# Patient Record
Sex: MALE | Race: WHITE | NOT HISPANIC OR LATINO
[De-identification: names, ages, dates, MRNs, and addresses within clinical notes are randomized per-mention and may not be internally consistent; named-entity substitution may affect disease eponyms.]

---
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
## 2020-08-25 ENCOUNTER — APPOINTMENT (OUTPATIENT)
Dept: ALLERGY | Facility: CLINIC | Age: 20
End: 2020-08-25
Payer: COMMERCIAL

## 2020-08-25 VITALS
TEMPERATURE: 98.3 F | HEART RATE: 64 BPM | WEIGHT: 180 LBS | RESPIRATION RATE: 16 BRPM | SYSTOLIC BLOOD PRESSURE: 100 MMHG | BODY MASS INDEX: 23.86 KG/M2 | HEIGHT: 73 IN | DIASTOLIC BLOOD PRESSURE: 70 MMHG | OXYGEN SATURATION: 98 %

## 2020-08-25 PROCEDURE — 99214 OFFICE O/P EST MOD 30 MIN: CPT

## 2020-08-25 NOTE — ASSESSMENT
[FreeTextEntry1] : Cough secondary to PND and asthma:\par \par Singulair 10 mg QD\par Proair 2 puffs QID prn\par RV any recurrent symptoms

## 2020-08-25 NOTE — SOCIAL HISTORY
[Mother] : mother [Father] : father [Sister] : sister [Single] : single [None] : none [FreeTextEntry2] : will be dorming at [employer]  [Smokers in Household] : there are no smokers in the home

## 2020-08-25 NOTE — HISTORY OF PRESENT ILLNESS
[Eczematous rashes] : eczematous rashes [Food Allergies] : food allergies [Venom Reactions] : venom reactions [de-identified] : Increased coughing in order to enable him to breath - he has no more of his rescue inhaler.    He has had coughing episodes sporadically.  His nose feels open and clear.

## 2020-08-25 NOTE — PHYSICAL EXAM
[Healthy Appearance] : healthy appearance [Well Nourished] : well nourished [Alert] : alert [No Acute Distress] : no acute distress [Well Developed] : well developed [Normal Voice/Communication] : normal voice communication [Normal Lips/Tongue] : the lips and tongue were normal [Normal Nasal Mucosa] : the nasal mucosa was normal [Normal Tonsils] : normal tonsils [No Oral Lesions or Ulcers] : no oral lesions or ulcers [No LAD] : no lymphadenopathy [No Neck Mass] : no neck mass was observed [No Thyroid Mass] : no thyroid mass [Supple] : the neck was supple [Normal Rate and Effort] : normal respiratory rhythm and effort [No Crackles] : no crackles [No Retractions] : no retractions [Bilateral Audible Breath Sounds] : bilateral audible breath sounds [Normal Rate] : heart rate was normal  [Normal S1, S2] : normal S1 and S2 [No murmur] : no murmur [Regular Rhythm] : with a regular rhythm [Normal Cervical Lymph Nodes] : cervical [No clubbing] : no clubbing [No Edema] : no edema [No Cyanosis] : no cyanosis [Normal Mood] : mood was normal [Judgment and Insight Age Appropriate] : judgement and insight is age appropriate [Normal Affect] : affect was normal [Alert, Awake, Oriented as Age-Appropriate] : alert, awake, oriented as age appropriate [Suborbital Bogginess] : no suborbital bogginess (allergic shiners) [Conjunctival Erythema] : no conjunctival erythema [Boggy Nasal Turbinates] : no boggy and/or pale nasal turbinates [Wheezing] : no wheezing was heard [Eczematous Patches] : no eczematous patches [Xerosis] : no xerosis

## 2023-07-11 ENCOUNTER — NON-APPOINTMENT (OUTPATIENT)
Age: 23
End: 2023-07-11

## 2023-07-11 ENCOUNTER — APPOINTMENT (OUTPATIENT)
Dept: ALLERGY | Facility: CLINIC | Age: 23
End: 2023-07-11
Payer: COMMERCIAL

## 2023-07-11 VITALS
RESPIRATION RATE: 14 BRPM | SYSTOLIC BLOOD PRESSURE: 108 MMHG | TEMPERATURE: 98.9 F | DIASTOLIC BLOOD PRESSURE: 70 MMHG | HEART RATE: 62 BPM | OXYGEN SATURATION: 97 %

## 2023-07-11 PROCEDURE — 99213 OFFICE O/P EST LOW 20 MIN: CPT

## 2023-07-11 RX ORDER — ALBUTEROL SULFATE 90 UG/1
108 (90 BASE) INHALANT RESPIRATORY (INHALATION)
Qty: 1 | Refills: 0 | Status: DISCONTINUED | COMMUNITY
Start: 2019-06-10 | End: 2023-07-11

## 2023-07-11 RX ORDER — ALBUTEROL SULFATE 90 UG/1
108 (90 BASE) POWDER, METERED RESPIRATORY (INHALATION)
Qty: 1 | Refills: 1 | Status: DISCONTINUED | COMMUNITY
Start: 2019-06-10 | End: 2023-07-11

## 2023-07-11 RX ORDER — MONTELUKAST 10 MG/1
10 TABLET, FILM COATED ORAL DAILY
Qty: 90 | Refills: 4 | Status: DISCONTINUED | COMMUNITY
Start: 2020-08-25 | End: 2023-07-11

## 2023-07-11 RX ORDER — FLUTICASONE PROPIONATE 50 UG/1
50 SPRAY, METERED NASAL DAILY
Qty: 1 | Refills: 2 | Status: DISCONTINUED | COMMUNITY
Start: 2018-08-21 | End: 2023-07-11

## 2023-07-11 RX ORDER — ALBUTEROL SULFATE 90 UG/1
108 (90 BASE) INHALANT RESPIRATORY (INHALATION)
Qty: 1 | Refills: 2 | Status: ACTIVE | COMMUNITY
Start: 2020-08-25 | End: 1900-01-01

## 2023-07-11 RX ORDER — AZELASTINE HYDROCHLORIDE 137 UG/1
0.1 SPRAY, METERED NASAL TWICE DAILY
Qty: 1 | Refills: 2 | Status: DISCONTINUED | COMMUNITY
Start: 2018-08-21 | End: 2023-07-11

## 2023-07-11 RX ORDER — ALBUTEROL SULFATE 90 UG/1
108 (90 BASE) AEROSOL, METERED RESPIRATORY (INHALATION)
Qty: 1 | Refills: 5 | Status: DISCONTINUED | COMMUNITY
Start: 2018-08-21 | End: 2023-07-11

## 2023-07-11 NOTE — SOCIAL HISTORY
[None] : none [Single] : single [Smokers in Household] : there are no smokers in the home [Mother] : mother [Father] : father [Sister] : sister [FreeTextEntry2] : will be dorming at [place]

## 2023-07-11 NOTE — ASSESSMENT
[FreeTextEntry1] : Mild intermittent asthma:\par \par Albuterol 2 puffs QID prn \par If coughing becomes more problematic will restart on Singulair \par Advised patient to carry the albuterol inhaler with him in case he develops acute coughing/wheezing\par RV 1 year or prn

## 2023-07-11 NOTE — HISTORY OF PRESENT ILLNESS
[de-identified] : Patient has not used his rescue inhaler since last seen - ran out of medication - never started Singulair - has some AM coughing

## 2023-07-11 NOTE — PHYSICAL EXAM
[Alert] : alert [Well Nourished] : well nourished [No Acute Distress] : no acute distress [Well Developed] : well developed [No Neck Mass] : no neck mass was observed [No LAD] : no lymphadenopathy [Normal Rate and Effort] : normal respiratory rhythm and effort [No Crackles] : no crackles [No Retractions] : no retractions [Bilateral Audible Breath Sounds] : bilateral audible breath sounds [Wheezing] : no wheezing was heard [Normal Rate] : heart rate was normal  [Normal S1, S2] : normal S1 and S2 [Regular Rhythm] : with a regular rhythm [Normal Cervical Lymph Nodes] : cervical [No clubbing] : no clubbing [No Cyanosis] : no cyanosis [Normal Mood] : mood was normal [Normal Affect] : affect was normal [Judgment and Insight Age Appropriate] : judgement and insight is age appropriate [Alert, Awake, Oriented as Age-Appropriate] : alert, awake, oriented as age appropriate

## 2023-10-24 ENCOUNTER — APPOINTMENT (OUTPATIENT)
Dept: PULMONOLOGY | Facility: CLINIC | Age: 23
End: 2023-10-24
Payer: COMMERCIAL

## 2023-10-24 VITALS
TEMPERATURE: 98.7 F | WEIGHT: 215 LBS | OXYGEN SATURATION: 98 % | DIASTOLIC BLOOD PRESSURE: 70 MMHG | BODY MASS INDEX: 28.49 KG/M2 | HEIGHT: 73 IN | HEART RATE: 74 BPM | SYSTOLIC BLOOD PRESSURE: 110 MMHG | RESPIRATION RATE: 18 BRPM

## 2023-10-24 DIAGNOSIS — R06.02 SHORTNESS OF BREATH: ICD-10-CM

## 2023-10-24 DIAGNOSIS — Z78.9 OTHER SPECIFIED HEALTH STATUS: ICD-10-CM

## 2023-10-24 DIAGNOSIS — K21.9 GASTRO-ESOPHAGEAL REFLUX DISEASE W/OUT ESOPHAGITIS: ICD-10-CM

## 2023-10-24 DIAGNOSIS — J45.991 COUGH VARIANT ASTHMA: ICD-10-CM

## 2023-10-24 DIAGNOSIS — Z15.89 GENETIC SUSCEPTIBILITY TO OTHER DISEASE: ICD-10-CM

## 2023-10-24 DIAGNOSIS — Q67.6 PECTUS EXCAVATUM: ICD-10-CM

## 2023-10-24 DIAGNOSIS — J30.89 OTHER ALLERGIC RHINITIS: ICD-10-CM

## 2023-10-24 DIAGNOSIS — Z83.79 FAMILY HISTORY OF OTHER DISEASES OF THE DIGESTIVE SYSTEM: ICD-10-CM

## 2023-10-24 DIAGNOSIS — Z80.8 FAMILY HISTORY OF MALIGNANT NEOPLASM OF OTHER ORGANS OR SYSTEMS: ICD-10-CM

## 2023-10-24 DIAGNOSIS — Z87.898 PERSONAL HISTORY OF OTHER SPECIFIED CONDITIONS: ICD-10-CM

## 2023-10-24 DIAGNOSIS — R55 SYNCOPE AND COLLAPSE: ICD-10-CM

## 2023-10-24 DIAGNOSIS — R05.3 CHRONIC COUGH: ICD-10-CM

## 2023-10-24 PROCEDURE — 99204 OFFICE O/P NEW MOD 45 MIN: CPT | Mod: 25

## 2023-10-24 PROCEDURE — 71046 X-RAY EXAM CHEST 2 VIEWS: CPT

## 2023-10-24 PROCEDURE — 95012 NITRIC OXIDE EXP GAS DETER: CPT

## 2023-10-24 PROCEDURE — 94010 BREATHING CAPACITY TEST: CPT

## 2023-10-24 PROCEDURE — 94729 DIFFUSING CAPACITY: CPT

## 2023-10-24 PROCEDURE — 94727 GAS DIL/WSHOT DETER LNG VOL: CPT

## 2023-10-24 PROCEDURE — 94618 PULMONARY STRESS TESTING: CPT

## 2023-10-24 RX ORDER — FLUTICASONE FUROATE AND VILANTEROL TRIFENATATE 200; 25 UG/1; UG/1
200-25 POWDER RESPIRATORY (INHALATION)
Qty: 1 | Refills: 2 | Status: ACTIVE | COMMUNITY
Start: 2023-10-24 | End: 1900-01-01

## 2023-10-24 RX ORDER — FLUTICASONE PROPIONATE 50 UG/1
50 SPRAY, METERED NASAL
Refills: 0 | Status: ACTIVE | COMMUNITY

## 2023-10-24 RX ORDER — FEXOFENADINE HCL 180 MG
180 TABLET ORAL
Refills: 0 | Status: ACTIVE | COMMUNITY

## 2023-12-05 ENCOUNTER — LABORATORY RESULT (OUTPATIENT)
Age: 23
End: 2023-12-05

## 2023-12-06 LAB
24R-OH-CALCIDIOL SERPL-MCNC: 60.1 PG/ML
25(OH)D3 SERPL-MCNC: 47.3 NG/ML
A1AT SERPL-MCNC: 128 MG/DL
BASOPHILS # BLD AUTO: 0.04 K/UL
BASOPHILS NFR BLD AUTO: 0.5 %
EOSINOPHIL # BLD AUTO: 0.32 K/UL
EOSINOPHIL NFR BLD AUTO: 4.1 %
HCT VFR BLD CALC: 45.9 %
HGB BLD-MCNC: 15.3 G/DL
IMM GRANULOCYTES NFR BLD AUTO: 0.3 %
LYMPHOCYTES # BLD AUTO: 2.19 K/UL
LYMPHOCYTES NFR BLD AUTO: 27.8 %
MAN DIFF?: NORMAL
MCHC RBC-ENTMCNC: 30.4 PG
MCHC RBC-ENTMCNC: 33.3 GM/DL
MCV RBC AUTO: 91.3 FL
MONOCYTES # BLD AUTO: 0.62 K/UL
MONOCYTES NFR BLD AUTO: 7.9 %
NEUTROPHILS # BLD AUTO: 4.68 K/UL
NEUTROPHILS NFR BLD AUTO: 59.4 %
PLATELET # BLD AUTO: 282 K/UL
RBC # BLD: 5.03 M/UL
RBC # FLD: 11.9 %
WBC # FLD AUTO: 7.87 K/UL

## 2023-12-07 LAB
A ALTERNATA IGE QN: 0.62 KUA/L
A ALTERNATA IGE QN: 0.62 KUA/L
A FUMIGATUS IGE QN: <0.1 KUA/L
A FUMIGATUS IGE QN: <0.1 KUA/L
ALMOND IGE QN: 0.82 KUA/L
BERMUDA GRASS IGE QN: <0.1 KUA/L
BOXELDER IGE QN: 0.15 KUA/L
BRAZIL NUT IGE QN: <0.1 KUA/L
C ALBICANS IGE QN: 0.28 KUA/L
C HERBARUM IGE QN: <0.1 KUA/L
C HERBARUM IGE QN: <0.1 KUA/L
CALIF WALNUT IGE QN: 0.7 KUA/L
CASHEW NUT IGE QN: <0.1 KUA/L
CAT DANDER IGE QN: 2.44 KUA/L
CAT DANDER IGE QN: 2.44 KUA/L
CMN PIGWEED IGE QN: <0.1 KUA/L
CODFISH IGE QN: <0.1 KUA/L
COMMON RAGWEED IGE QN: 0.17 KUA/L
COMMON RAGWEED IGE QN: 0.17 KUA/L
COTTONWOOD IGE QN: <0.1 KUA/L
COW MILK IGE QN: <0.1 KUA/L
D FARINAE IGE QN: 1.95 KUA/L
D FARINAE IGE QN: 1.95 KUA/L
D PTERONYSS IGE QN: 1.13 KUA/L
D PTERONYSS IGE QN: 1.13 KUA/L
DEPRECATED A ALTERNATA IGE RAST QL: 1 (ref 0–?)
DEPRECATED A ALTERNATA IGE RAST QL: 1 (ref 0–?)
DEPRECATED A FUMIGATUS IGE RAST QL: 0 (ref 0–?)
DEPRECATED A FUMIGATUS IGE RAST QL: 0 (ref 0–?)
DEPRECATED ALMOND IGE RAST QL: 2 (ref 0–?)
DEPRECATED BERMUDA GRASS IGE RAST QL: 0 (ref 0–?)
DEPRECATED BOXELDER IGE RAST QL: NORMAL (ref 0–?)
DEPRECATED BRAZIL NUT IGE RAST QL: 0 (ref 0–?)
DEPRECATED C ALBICANS IGE RAST QL: NORMAL
DEPRECATED C HERBARUM IGE RAST QL: 0 (ref 0–?)
DEPRECATED C HERBARUM IGE RAST QL: 0 (ref 0–?)
DEPRECATED CASHEW NUT IGE RAST QL: 0 (ref 0–?)
DEPRECATED CAT DANDER IGE RAST QL: 2 (ref 0–?)
DEPRECATED CAT DANDER IGE RAST QL: 2 (ref 0–?)
DEPRECATED CODFISH IGE RAST QL: 0 (ref 0–?)
DEPRECATED COMMON PIGWEED IGE RAST QL: 0 (ref 0–?)
DEPRECATED COMMON RAGWEED IGE RAST QL: NORMAL (ref 0–?)
DEPRECATED COMMON RAGWEED IGE RAST QL: NORMAL (ref 0–?)
DEPRECATED COTTONWOOD IGE RAST QL: 0 (ref 0–?)
DEPRECATED COW MILK IGE RAST QL: 0 (ref 0–?)
DEPRECATED D FARINAE IGE RAST QL: 2 (ref 0–?)
DEPRECATED D FARINAE IGE RAST QL: 2 (ref 0–?)
DEPRECATED D PTERONYSS IGE RAST QL: 2 (ref 0–?)
DEPRECATED D PTERONYSS IGE RAST QL: 2 (ref 0–?)
DEPRECATED DOG DANDER IGE RAST QL: 1 (ref 0–?)
DEPRECATED DOG DANDER IGE RAST QL: 1 (ref 0–?)
DEPRECATED DUCK FEATHER IGE RAST QL: 0
DEPRECATED EGG WHITE IGE RAST QL: 0 (ref 0–?)
DEPRECATED GOOSE FEATHER IGE RAST QL: 0
DEPRECATED GOOSEFOOT IGE RAST QL: NORMAL (ref 0–?)
DEPRECATED HAZELNUT IGE RAST QL: 3 (ref 0–?)
DEPRECATED LONDON PLANE IGE RAST QL: 0 (ref 0–?)
DEPRECATED M RACEMOSUS IGE RAST QL: 0
DEPRECATED MOUSE URINE PROT IGE RAST QL: NORMAL (ref 0–?)
DEPRECATED MUGWORT IGE RAST QL: 0 (ref 0–?)
DEPRECATED P NOTATUM IGE RAST QL: 0 (ref 0–?)
DEPRECATED PEANUT IGE RAST QL: 1 (ref 0–?)
DEPRECATED RED CEDAR IGE RAST QL: 0 (ref 0–?)
DEPRECATED ROACH IGE RAST QL: 0 (ref 0–?)
DEPRECATED ROACH IGE RAST QL: 0 (ref 0–?)
DEPRECATED SALMON IGE RAST QL: 0 (ref 0–?)
DEPRECATED SCALLOP IGE RAST QL: <0.1 KUA/L
DEPRECATED SESAME SEED IGE RAST QL: 0 (ref 0–?)
DEPRECATED SHEEP SORREL IGE RAST QL: 0 (ref 0–?)
DEPRECATED SHRIMP IGE RAST QL: 0 (ref 0–?)
DEPRECATED SILVER BIRCH IGE RAST QL: 3 (ref 0–?)
DEPRECATED SOYBEAN IGE RAST QL: 0 (ref 0–?)
DEPRECATED TIMOTHY IGE RAST QL: NORMAL (ref 0–?)
DEPRECATED TIMOTHY IGE RAST QL: NORMAL (ref 0–?)
DEPRECATED TUNA IGE RAST QL: 0 (ref 0–?)
DEPRECATED WALNUT IGE RAST QL: 0 (ref 0–?)
DEPRECATED WHEAT IGE RAST QL: 0 (ref 0–?)
DEPRECATED WHITE ASH IGE RAST QL: NORMAL (ref 0–?)
DEPRECATED WHITE OAK IGE RAST QL: 3 (ref 0–?)
DEPRECATED WHITE OAK IGE RAST QL: 3 (ref 0–?)
DOG DANDER IGE QN: 0.47 KUA/L
DOG DANDER IGE QN: 0.47 KUA/L
DUCK FEATHER IGE QN: <0.1 KUA/L
EGG WHITE IGE QN: <0.1 KUA/L
GOOSE FEATHER IGE QN: <0.1 KUA/L
GOOSEFOOT IGE QN: 0.19 KUA/L
HAZELNUT IGE QN: 6.98 KUA/L
LONDON PLANE IGE QN: <0.1 KUA/L
M RACEMOSUS IGE QN: <0.1 KUA/L
MOUSE URINE PROT IGE QN: 0.16 KUA/L
MUGWORT IGE QN: <0.1 KUA/L
MULBERRY (T70) CLASS: 0 (ref 0–?)
MULBERRY (T70) CONC: <0.1 KUA/L
P NOTATUM IGE QN: <0.1 KUA/L
PEANUT IGE QN: 0.4 KUA/L
RED CEDAR IGE QN: <0.1 KUA/L
ROACH IGE QN: <0.1 KUA/L
ROACH IGE QN: <0.1 KUA/L
SALMON IGE QN: <0.1 KUA/L
SCALLOP IGE QN: 0 (ref 0–?)
SCALLOP IGE QN: <0.1 KUA/L
SESAME SEED IGE QN: <0.1 KUA/L
SHEEP SORREL IGE QN: <0.1 KUA/L
SILVER BIRCH IGE QN: 9.29 KUA/L
SOYBEAN IGE QN: <0.1 KUA/L
TIMOTHY IGE QN: 0.32 KUA/L
TIMOTHY IGE QN: 0.32 KUA/L
TOTAL IGE SMQN RAST: 58 KU/L
TREE ALLERG MIX1 IGE QL: 2 (ref 0–?)
TUNA IGE QN: <0.1 KUA/L
WALNUT IGE QN: <0.1 KUA/L
WHEAT IGE QN: <0.1 KUA/L
WHITE ASH IGE QN: 0.11 KUA/L
WHITE ELM IGE QN: 0.6 KUA/L
WHITE ELM IGE QN: 1 (ref 0–?)
WHITE OAK IGE QN: 7.49 KUA/L
WHITE OAK IGE QN: 7.49 KUA/L

## 2023-12-09 LAB — STRONGYLOIDES AB SER IA-ACNC: NEGATIVE

## 2023-12-11 LAB
A1AT PHENOTYP SERPL-IMP: NORMAL
A1AT SERPL-MCNC: 128 MG/DL

## 2023-12-12 LAB
ANNOTATION COMMENT IMP: NORMAL
ELECTRONIC SIGNATURE: NORMAL
SERPINA1 GENE MUT TESTED BLD/T: NORMAL

## 2023-12-21 ENCOUNTER — APPOINTMENT (OUTPATIENT)
Dept: PULMONOLOGY | Facility: CLINIC | Age: 23
End: 2023-12-21

## 2024-01-08 ENCOUNTER — APPOINTMENT (OUTPATIENT)
Dept: ALLERGY | Facility: CLINIC | Age: 24
End: 2024-01-08
Payer: COMMERCIAL

## 2024-01-08 ENCOUNTER — NON-APPOINTMENT (OUTPATIENT)
Age: 24
End: 2024-01-08

## 2024-01-08 VITALS
HEIGHT: 73 IN | BODY MASS INDEX: 28.49 KG/M2 | HEART RATE: 72 BPM | OXYGEN SATURATION: 96 % | DIASTOLIC BLOOD PRESSURE: 66 MMHG | TEMPERATURE: 98.5 F | SYSTOLIC BLOOD PRESSURE: 128 MMHG | WEIGHT: 215 LBS

## 2024-01-08 PROCEDURE — 99213 OFFICE O/P EST LOW 20 MIN: CPT

## 2025-05-05 ENCOUNTER — APPOINTMENT (OUTPATIENT)
Dept: ALLERGY | Facility: CLINIC | Age: 25
End: 2025-05-05
Payer: COMMERCIAL

## 2025-05-05 VITALS
DIASTOLIC BLOOD PRESSURE: 76 MMHG | TEMPERATURE: 98.3 F | HEART RATE: 80 BPM | OXYGEN SATURATION: 96 % | SYSTOLIC BLOOD PRESSURE: 120 MMHG

## 2025-05-05 PROCEDURE — 99214 OFFICE O/P EST MOD 30 MIN: CPT | Mod: 25

## 2025-05-05 PROCEDURE — 94060 EVALUATION OF WHEEZING: CPT

## 2025-05-05 PROCEDURE — 95012 NITRIC OXIDE EXP GAS DETER: CPT

## 2025-05-05 RX ORDER — FLUTICASONE FUROATE AND VILANTEROL TRIFENATATE 200; 25 UG/1; UG/1
200-25 POWDER RESPIRATORY (INHALATION)
Qty: 1 | Refills: 2 | Status: ACTIVE | COMMUNITY
Start: 2025-05-05 | End: 1900-01-01

## 2025-05-05 RX ORDER — ALBUTEROL SULFATE 90 UG/1
108 (90 BASE) INHALANT RESPIRATORY (INHALATION)
Qty: 1 | Refills: 1 | Status: ACTIVE | COMMUNITY
Start: 2025-05-05 | End: 1900-01-01

## 2025-08-08 ENCOUNTER — RX RENEWAL (OUTPATIENT)
Age: 25
End: 2025-08-08